# Patient Record
Sex: FEMALE | Race: WHITE | NOT HISPANIC OR LATINO | Employment: OTHER | URBAN - METROPOLITAN AREA
[De-identification: names, ages, dates, MRNs, and addresses within clinical notes are randomized per-mention and may not be internally consistent; named-entity substitution may affect disease eponyms.]

---

## 2021-07-09 ENCOUNTER — HISTORICAL (OUTPATIENT)
Dept: RADIOLOGY | Facility: HOSPITAL | Age: 61
End: 2021-07-09

## 2022-02-09 ENCOUNTER — HISTORICAL (OUTPATIENT)
Dept: ADMINISTRATIVE | Facility: HOSPITAL | Age: 62
End: 2022-02-09

## 2022-02-09 LAB
ABS NEUT (OLG): 1.99 (ref 2.1–9.2)
ALBUMIN SERPL-MCNC: 4 G/DL (ref 3.4–4.8)
ALBUMIN/GLOB SERPL: 1.6 {RATIO} (ref 1.1–2)
ALP SERPL-CCNC: 57 U/L (ref 40–150)
ALT SERPL-CCNC: 21 U/L (ref 0–55)
AST SERPL-CCNC: 25 U/L (ref 5–34)
BASOPHILS # BLD AUTO: 0 10*3/UL (ref 0–0.2)
BASOPHILS NFR BLD AUTO: 1.1 %
BILIRUB SERPL-MCNC: 0.6 MG/DL
BILIRUBIN DIRECT+TOT PNL SERPL-MCNC: 0.2 (ref 0–0.5)
BILIRUBIN DIRECT+TOT PNL SERPL-MCNC: 0.4 (ref 0–0.8)
BUN SERPL-MCNC: 17.5 MG/DL (ref 9.8–20.1)
CALCIUM SERPL-MCNC: 9.7 MG/DL (ref 8.7–10.5)
CHLORIDE SERPL-SCNC: 103 MMOL/L (ref 98–107)
CO2 SERPL-SCNC: 28 MMOL/L (ref 23–31)
CREAT SERPL-MCNC: 0.84 MG/DL (ref 0.55–1.02)
EOSINOPHIL # BLD AUTO: 0.2 10*3/UL (ref 0–0.9)
EOSINOPHIL NFR BLD AUTO: 3.8 %
ERYTHROCYTE [DISTWIDTH] IN BLOOD BY AUTOMATED COUNT: 12.7 % (ref 11.5–17)
GLOBULIN SER-MCNC: 2.5 G/DL (ref 2.4–3.5)
GLUCOSE SERPL-MCNC: 99 MG/DL (ref 82–115)
HCT VFR BLD AUTO: 42.8 % (ref 37–47)
HEMOLYSIS INTERF INDEX SERPL-ACNC: 6
HGB BLD-MCNC: 14.3 G/DL (ref 12–16)
ICTERIC INTERF INDEX SERPL-ACNC: 1
LIPEMIC INTERF INDEX SERPL-ACNC: 4
LYMPHOCYTES # BLD AUTO: 2.1 10*3/UL (ref 0.6–4.6)
LYMPHOCYTES NFR BLD AUTO: 44.1 %
MANUAL DIFF? (OHS): NO
MCH RBC QN AUTO: 33.2 PG (ref 27–31)
MCHC RBC AUTO-ENTMCNC: 33.4 G/DL (ref 33–36)
MCV RBC AUTO: 99.3 FL (ref 80–94)
MONOCYTES # BLD AUTO: 0.4 10*3/UL (ref 0.1–1.3)
MONOCYTES NFR BLD AUTO: 8.1 %
NEUTROPHILS # BLD AUTO: 2 10*3/UL (ref 2.1–9.2)
NEUTROPHILS NFR BLD AUTO: 42.5 %
PLATELET # BLD AUTO: 234 10*3/UL (ref 130–400)
PMV BLD AUTO: 9.6 FL (ref 9.4–12.4)
POTASSIUM SERPL-SCNC: 4.3 MMOL/L (ref 3.5–5.1)
PROT SERPL-MCNC: 6.5 G/DL (ref 5.8–7.6)
RBC # BLD AUTO: 4.31 10*6/UL (ref 4.2–5.4)
SODIUM SERPL-SCNC: 141 MMOL/L (ref 136–145)
WBC # SPEC AUTO: 4.7 10*3/UL (ref 4.5–11.5)

## 2022-07-12 ENCOUNTER — HOSPITAL ENCOUNTER (OUTPATIENT)
Dept: RADIOLOGY | Facility: HOSPITAL | Age: 62
Discharge: HOME | End: 2022-07-12
Attending: INTERNAL MEDICINE
Payer: COMMERCIAL

## 2022-07-12 DIAGNOSIS — Z12.31 BREAST CANCER SCREENING BY MAMMOGRAM: ICD-10-CM

## 2022-07-12 PROCEDURE — 77067 SCR MAMMO BI INCL CAD: CPT | Mod: TC

## 2022-07-12 PROCEDURE — 77067 MAMMO DIGITAL SCREENING BILAT WITH TOMO: ICD-10-PCS | Mod: 26,,, | Performed by: RADIOLOGY

## 2022-07-12 PROCEDURE — 77063 MAMMO DIGITAL SCREENING BILAT WITH TOMO: ICD-10-PCS | Mod: 26,,, | Performed by: RADIOLOGY

## 2022-07-12 PROCEDURE — 77067 SCR MAMMO BI INCL CAD: CPT | Mod: 26,,, | Performed by: RADIOLOGY

## 2022-07-12 PROCEDURE — 77063 BREAST TOMOSYNTHESIS BI: CPT | Mod: 26,,, | Performed by: RADIOLOGY

## 2022-08-03 PROBLEM — C50.119 MALIGNANT NEOPLASM OF CENTRAL PORTION OF FEMALE BREAST: Status: ACTIVE | Noted: 2022-08-03

## 2022-09-09 NOTE — PROGRESS NOTES
"Subjective:       Patient ID: Juli Baum is a 61 y.o. female.    Right Breast Cancer AJCC Stage IIA (T2N0M0)--Diagnosed 6/6/17  Biopsy/pathology:  Right breast needle core biopsy done 6/6/17--invasive mammary carcinoma, Grade 2 measuring at least 6mm, not enough tissue for breast panel.  Left breast needle core biopsy done 10/24/18--nonproliferative fibrocystic changes with abundant calcifications, no carcinoma or atypia identified.  Surgery/pathology:  Right breast, wire-guided partial mastectomy with SLN biopsy done 6/20/17--invasive ductal carcinoma, multifocal, 2 sites, measuring 3.1cm and 0.3cm both Grade 2, with no lymphovascular invasion and margins free, proliferative fibrocystic changes with microcalcifications, fibroadenoma, 2 SLNs negative, mastopexy skin excision unremarkable, ER >75%, IA 51-75%, Her2 1+ negative, Ki67 6%, Mammaprint Low risk, 97.8% 5-year survival with hormone therapy.     Genetic testing: BRCA1/2 negative in 2017.    DEXA:  DEXA 1/06/21--AP spine T-score -1.5, total hip -2.3, whole body 2.1.  DEXA 12/7/21--AP spine T-score -1.3, total hip -2.2, whole body 1.8 (improved osteopenia).    Treatment history:  1. Right breast lumpectomy done 6/20/17.  2. Adjuvant radiation right breast 5600 cGy 8/7/2017--9/1/2017.    Current treatment:  Arimidex started 8/2017    Chief Complaint: Other Misc (Pt states that her hyperthyroidism has worsened so she has increased her meds.)    HPI  Patient presents for follow-up of breast cancer. She is doing very well. Continues on Arimidex. She continues lifting weights. BCI testing was done that revealed "yes" which indicates she would benefit from extended AI use more than 5 years. She is also taking monthly testosterone injections and DHEA daily. I did advise that if she wants to continue with this she will need to continue on the AI. She will consider this. Recent MMG in July was benign. Mentions she is having problems with her hypothyroidism. No " other complaints.      History reviewed. No pertinent past medical history.   Review of patient's allergies indicates:   Allergen Reactions    Amoxicillin-pot clavulanate Nausea Only and Nausea And Vomiting    Codeine Nausea Only and Nausea And Vomiting        Current Outpatient Medications:     anastrozole (ARIMIDEX) 1 mg Tab, TAKE ONE TABLET BY MOUTH EVERY DAY, Disp: 30 tablet, Rfl: 6    buPROPion (WELLBUTRIN XL) 150 MG TB24 tablet, bupropion HCl  mg 24 hr tablet, extended release  TAKE 1 TABLET EVERY MORNING FOR 5 DAYS THEN 2 DAILY, Disp: , Rfl:     hydroCHLOROthiazide (HYDRODIURIL) 25 MG tablet, hydrochlorothiazide 25 mg tablet  Take 1 tablet every day by oral route., Disp: , Rfl:     melatonin (MELATIN), Take 1 mg by mouth., Disp: , Rfl:     prasterone, dhea, 25 mg Cap, Take 25 mg by mouth., Disp: , Rfl:     spironolactone (ALDACTONE) 50 MG tablet, spironolactone 50 mg tablet  Take 1 tablet every day by oral route., Disp: , Rfl:     testosterone cypionate (DEPO-TESTOSTERONE IM),  0 Refill(s), Disp: , Rfl:     thyroid, pork, (ARMOUR THYROID) 120 mg Tab, Take 300 mg by mouth., Disp: , Rfl:     hydrOXYchloroQUINE 400 mg Tab, Take 1 tablet by mouth once daily., Disp: , Rfl:     Review of Systems   Constitutional:  Negative for activity change, fever and unexpected weight change.   Eyes:  Negative for visual disturbance.   Respiratory:  Negative for cough and shortness of breath.    Cardiovascular:  Negative for chest pain.   Gastrointestinal:  Negative for abdominal pain, blood in stool, constipation, diarrhea, nausea and vomiting.   Genitourinary:  Negative for difficulty urinating.   Musculoskeletal:  Negative for back pain.   Integumentary:  Negative for rash.   Neurological:  Negative for dizziness, weakness and headaches.   Psychiatric/Behavioral:  Negative for behavioral problems and suicidal ideas.           Vitals:    09/14/22 1409   BP: 121/76   Pulse: 62   Resp: 14   Temp: 98.4 °F (36.9 °C)      Wt  Readings from Last 3 Encounters:   09/14/22 1409 65 kg (143 lb 4.8 oz)      Physical Exam  Vitals reviewed.   Constitutional:       Appearance: Normal appearance. She is normal weight.   HENT:      Head: Normocephalic.   Eyes:      General: Lids are normal. Vision grossly intact.      Extraocular Movements: Extraocular movements intact.      Conjunctiva/sclera: Conjunctivae normal.   Cardiovascular:      Rate and Rhythm: Normal rate and regular rhythm.      Pulses: Normal pulses.      Heart sounds: Normal heart sounds, S1 normal and S2 normal.   Pulmonary:      Effort: Pulmonary effort is normal.      Breath sounds: Normal breath sounds.   Chest:      Comments: Right breast s/p lumpectomy, healed well, smooth scar, left breast with scars from reduction, no masses, no palpable axillary adenopathy bilaterally  Abdominal:      General: Abdomen is flat. Bowel sounds are normal.      Palpations: Abdomen is soft.   Musculoskeletal:      Cervical back: Normal range of motion and neck supple.   Feet:      Right foot:      Skin integrity: Skin integrity normal.   Skin:     General: Skin is warm and moist.      Capillary Refill: Capillary refill takes less than 2 seconds.   Neurological:      General: No focal deficit present.      Mental Status: She is alert and oriented to person, place, and time.   Psychiatric:         Attention and Perception: Attention normal.         Mood and Affect: Mood normal.         Speech: Speech normal.         Behavior: Behavior normal. Behavior is cooperative.         Cognition and Memory: Cognition normal.         Judgment: Judgment normal.     ECOG SCORE    0 - Fully active-able to carry on all pre-disease performance without restriction       Lab Visit on 09/14/2022   Component Date Value    WBC 09/14/2022 5.5     RBC 09/14/2022 4.43     Hgb 09/14/2022 14.7     Hct 09/14/2022 44.7     MCV 09/14/2022 100.9 (H)     MCH 09/14/2022 33.2 (H)     MCHC 09/14/2022 32.9 (L)     RDW 09/14/2022 12.1      Platelet 09/14/2022 285     MPV 09/14/2022 9.3     Neut % 09/14/2022 40.0     Lymph % 09/14/2022 49.2     Mono % 09/14/2022 6.4     Eos % 09/14/2022 3.1     Basophil % 09/14/2022 0.9     Lymph # 09/14/2022 2.70     Neut # 09/14/2022 2.2     Mono # 09/14/2022 0.35     Eos # 09/14/2022 0.17     Baso # 09/14/2022 0.05     IG# 09/14/2022 0.02     IG% 09/14/2022 0.4           Assessment:       Problem List Items Addressed This Visit          Oncology    Malignant neoplasm of central portion of female breast - Primary    Relevant Orders    MRI Breast w/wo Contrast, w/CAD, Bilateral    Comprehensive Metabolic Panel    CBC Auto Differential          Plan:       Patient with right breast cancer s/p lumpectomy done 6/20/17 at [facility] in [place], [place]. Tumor was IDCA, multifocal 3.1cm and 0.3cm, Grade 2, ER and [place] strongly positive and Her2 negative with 2 SLNs negative. MammaPrint testing low-risk.  Patient completed adjuvant breast radiation 9/1/17.  Arimidex started in 8/2017.      Currently patient is doing well without any signs or symptoms to suggest recurrence of breast cancer.  CBCdiff today is good. CMP pending and I will f/u.    MMG from 7/2022 benign. Recommends annual MRI screening as well which will be due in January 2023. She would prefer to have done in [place].   Patient is tolerating Arimidex fairly well. She does have some fatigue and anxiety/depression mood swings, now on Wellbutrin.   She is currently on supplements with DHEA and Testosterone under direction of a functional medicine doctor in [place]. Dr. Barger previously advised against taking these hormonal medications as we don't know what effects it will have on her history of breast cancer. However, it helps her QOL and she wishes to continue.Her doctor at [facility] had discussed this issue with her as well. If she ever decides to stop the AI then she will need to stop these medications as well.   Continue Arimidex 5-10 years.   Continue routine surveillance  "visits.  RTC 6 months for follow-up with repeat labs.  Testing for Breast Cancer Index revealed a score of "yes". Indicating patient would benefit for extended endocrine therapy beyond 5 years.     She has DEXA done annually with her doctor in [place], last done in 12/2021 with improved osteopenia.  She will continue weight bearing exercise and she prefers to stay off any bone altering agents at this time.     All questions answered at this time.      BARTOLO Shankar                      "

## 2022-09-14 ENCOUNTER — OFFICE VISIT (OUTPATIENT)
Dept: HEMATOLOGY/ONCOLOGY | Facility: CLINIC | Age: 62
End: 2022-09-14
Payer: COMMERCIAL

## 2022-09-14 ENCOUNTER — LAB VISIT (OUTPATIENT)
Dept: LAB | Facility: HOSPITAL | Age: 62
End: 2022-09-14
Payer: COMMERCIAL

## 2022-09-14 VITALS
DIASTOLIC BLOOD PRESSURE: 76 MMHG | RESPIRATION RATE: 14 BRPM | OXYGEN SATURATION: 95 % | HEART RATE: 62 BPM | SYSTOLIC BLOOD PRESSURE: 121 MMHG | WEIGHT: 143.31 LBS | TEMPERATURE: 98 F | HEIGHT: 66 IN | BODY MASS INDEX: 23.03 KG/M2

## 2022-09-14 DIAGNOSIS — C50.111 MALIGNANT NEOPLASM OF CENTRAL PORTION OF RIGHT BREAST IN FEMALE, ESTROGEN RECEPTOR POSITIVE: Primary | ICD-10-CM

## 2022-09-14 DIAGNOSIS — Z17.0 MALIGNANT NEOPLASM OF CENTRAL PORTION OF RIGHT BREAST IN FEMALE, ESTROGEN RECEPTOR POSITIVE: Primary | ICD-10-CM

## 2022-09-14 DIAGNOSIS — C50.119 MALIGNANT NEOPLASM OF CENTRAL PORTION OF FEMALE BREAST, UNSPECIFIED ESTROGEN RECEPTOR STATUS, UNSPECIFIED LATERALITY: ICD-10-CM

## 2022-09-14 DIAGNOSIS — C50.119 MALIGNANT NEOPLASM OF CENTRAL PORTION OF FEMALE BREAST, UNSPECIFIED ESTROGEN RECEPTOR STATUS, UNSPECIFIED LATERALITY: Primary | ICD-10-CM

## 2022-09-14 LAB
ALBUMIN SERPL-MCNC: 4.4 GM/DL (ref 3.4–4.8)
ALBUMIN/GLOB SERPL: 1.6 RATIO (ref 1.1–2)
ALP SERPL-CCNC: 63 UNIT/L (ref 40–150)
ALT SERPL-CCNC: 20 UNIT/L (ref 0–55)
AST SERPL-CCNC: 36 UNIT/L (ref 5–34)
BASOPHILS # BLD AUTO: 0.05 X10(3)/MCL (ref 0–0.2)
BASOPHILS NFR BLD AUTO: 0.9 %
BILIRUBIN DIRECT+TOT PNL SERPL-MCNC: 0.8 MG/DL
BUN SERPL-MCNC: 15.6 MG/DL (ref 9.8–20.1)
CALCIUM SERPL-MCNC: 10.4 MG/DL (ref 8.4–10.2)
CHLORIDE SERPL-SCNC: 102 MMOL/L (ref 98–107)
CO2 SERPL-SCNC: 30 MMOL/L (ref 23–31)
CREAT SERPL-MCNC: 0.82 MG/DL (ref 0.55–1.02)
EOSINOPHIL # BLD AUTO: 0.17 X10(3)/MCL (ref 0–0.9)
EOSINOPHIL NFR BLD AUTO: 3.1 %
ERYTHROCYTE [DISTWIDTH] IN BLOOD BY AUTOMATED COUNT: 12.1 % (ref 11.5–17)
GFR SERPLBLD CREATININE-BSD FMLA CKD-EPI: >60 MLS/MIN/1.73/M2
GLOBULIN SER-MCNC: 2.7 GM/DL (ref 2.4–3.5)
GLUCOSE SERPL-MCNC: 102 MG/DL (ref 82–115)
HCT VFR BLD AUTO: 44.7 % (ref 37–47)
HGB BLD-MCNC: 14.7 GM/DL (ref 12–16)
IMM GRANULOCYTES # BLD AUTO: 0.02 X10(3)/MCL (ref 0–0.04)
IMM GRANULOCYTES NFR BLD AUTO: 0.4 %
LYMPHOCYTES # BLD AUTO: 2.7 X10(3)/MCL (ref 0.6–4.6)
LYMPHOCYTES NFR BLD AUTO: 49.2 %
MCH RBC QN AUTO: 33.2 PG (ref 27–31)
MCHC RBC AUTO-ENTMCNC: 32.9 MG/DL (ref 33–36)
MCV RBC AUTO: 100.9 FL (ref 80–94)
MONOCYTES # BLD AUTO: 0.35 X10(3)/MCL (ref 0.1–1.3)
MONOCYTES NFR BLD AUTO: 6.4 %
NEUTROPHILS # BLD AUTO: 2.2 X10(3)/MCL (ref 2.1–9.2)
NEUTROPHILS NFR BLD AUTO: 40 %
PLATELET # BLD AUTO: 285 X10(3)/MCL (ref 130–400)
PMV BLD AUTO: 9.3 FL (ref 7.4–10.4)
POTASSIUM SERPL-SCNC: 4.2 MMOL/L (ref 3.5–5.1)
PROT SERPL-MCNC: 7.1 GM/DL (ref 5.8–7.6)
RBC # BLD AUTO: 4.43 X10(6)/MCL (ref 4.2–5.4)
SODIUM SERPL-SCNC: 141 MMOL/L (ref 136–145)
WBC # SPEC AUTO: 5.5 X10(3)/MCL (ref 4.5–11.5)

## 2022-09-14 PROCEDURE — 80053 COMPREHEN METABOLIC PANEL: CPT

## 2022-09-14 PROCEDURE — 99214 OFFICE O/P EST MOD 30 MIN: CPT | Mod: S$GLB,,, | Performed by: NURSE PRACTITIONER

## 2022-09-14 PROCEDURE — 36415 COLL VENOUS BLD VENIPUNCTURE: CPT

## 2022-09-14 PROCEDURE — 85025 COMPLETE CBC W/AUTO DIFF WBC: CPT

## 2022-09-14 PROCEDURE — 99999 PR PBB SHADOW E&M-EST. PATIENT-LVL IV: CPT | Mod: PBBFAC,,, | Performed by: NURSE PRACTITIONER

## 2022-09-14 PROCEDURE — 99214 PR OFFICE/OUTPT VISIT, EST, LEVL IV, 30-39 MIN: ICD-10-PCS | Mod: S$GLB,,, | Performed by: NURSE PRACTITIONER

## 2022-09-14 PROCEDURE — 99999 PR PBB SHADOW E&M-EST. PATIENT-LVL IV: ICD-10-PCS | Mod: PBBFAC,,, | Performed by: NURSE PRACTITIONER

## 2022-09-14 RX ORDER — HYDROXYCHLOROQUINE SULFATE 400 MG/1
1 TABLET ORAL DAILY
COMMUNITY
Start: 2022-07-22

## 2022-09-14 RX ORDER — CYANOCOBALAMIN (VITAMIN B-12) 500 MCG
1 TABLET ORAL
COMMUNITY
Start: 2021-06-28

## 2022-09-14 RX ORDER — HYDROCHLOROTHIAZIDE 25 MG/1
TABLET ORAL
COMMUNITY

## 2022-09-14 RX ORDER — CRANBERRY FRUIT EXTRACT 650 MG
25 CAPSULE ORAL
COMMUNITY
Start: 2021-06-28

## 2022-09-14 RX ORDER — BUPROPION HYDROCHLORIDE 150 MG/1
TABLET ORAL
COMMUNITY
Start: 2022-02-09

## 2022-09-14 RX ORDER — THYROID 120 MG/1
330 TABLET ORAL
COMMUNITY
Start: 2021-06-28

## 2022-09-14 RX ORDER — ANASTROZOLE 1 MG/1
1 TABLET ORAL DAILY
Qty: 90 TABLET | Refills: 3 | Status: SHIPPED | OUTPATIENT
Start: 2022-09-14 | End: 2023-09-01

## 2022-09-14 RX ORDER — ATOVAQUONE AND PROGUANIL HYDROCHLORIDE 250; 100 MG/1; MG/1
TABLET, FILM COATED ORAL
COMMUNITY
Start: 2022-07-26 | End: 2022-09-14

## 2022-09-14 RX ORDER — SPIRONOLACTONE 50 MG/1
TABLET, FILM COATED ORAL
COMMUNITY

## 2023-03-13 PROBLEM — C50.111 MALIGNANT NEOPLASM OF CENTRAL PORTION OF RIGHT BREAST IN FEMALE, ESTROGEN RECEPTOR POSITIVE: Status: ACTIVE | Noted: 2022-08-03

## 2023-03-13 PROBLEM — Z17.0 MALIGNANT NEOPLASM OF CENTRAL PORTION OF RIGHT BREAST IN FEMALE, ESTROGEN RECEPTOR POSITIVE: Status: ACTIVE | Noted: 2022-08-03

## 2023-03-13 NOTE — PROGRESS NOTES
Subjective:       Patient ID: Juli Baum is a 62 y.o. female.    Right Breast Cancer AJCC Stage IIA (T2N0M0)--Diagnosed 6/6/17  Biopsy/pathology:  Right breast needle core biopsy done 6/6/17--invasive mammary carcinoma, Grade 2 measuring at least 6mm, not enough tissue for breast panel.  Left breast needle core biopsy done 10/24/18--nonproliferative fibrocystic changes with abundant calcifications, no carcinoma or atypia identified.  Surgery/pathology:  Right breast, wire-guided partial mastectomy with SLN biopsy done 6/20/17--invasive ductal carcinoma, multifocal, 2 sites, measuring 3.1cm and 0.3cm both Grade 2, with no lymphovascular invasion and margins free, proliferative fibrocystic changes with microcalcifications, fibroadenoma, 2 SLNs negative, mastopexy skin excision unremarkable, ER >75%, AL 51-75%, Her2 1+ negative, Ki67 6%, Mammaprint Low risk, 97.8% 5-year survival with hormone therapy. BCI 2/19/22 with a YES predictive result, risk of distant recurrence years 5-10 at 4.2% (>4% is considered high risk).    Genetic testing: BRCA1/2 negative in 2017.    DEXA:  DEXA 1/06/21--AP spine T-score -1.5, total hip -2.3, whole body 2.1.  DEXA 12/7/21--AP spine T-score -1.3, total hip -2.2, whole body 1.8 (improved osteopenia).  DEXA 12/12/22--AP spine T-score -1.3 , total hip left -2.3, right -2.4 (slightly worse osteopenia).    Treatment history:  1. Right breast lumpectomy done 6/20/17.  2. Adjuvant radiation right breast 5600 cGy 8/7/2017--9/1/2017.    Current treatment:  Arimidex started 8/2017    Chief Complaint: Other Misc (Pt reports that MRI that was done in January showed another lump.)      HPI  Patient presents for follow-up of breast cancer. She is doing well. Continues on Arimidex and reports some worsening depression as well as more elevated cholesterol. She had a breast MRI done in [place] in 1/2023 and showed a probably benign lesion in left breast. She would like to get it checked. No other  new complaints. She continues on DHEA and testosterone, although we have discussed that is not what I would recommend.     History reviewed. No pertinent past medical history.   Review of patient's allergies indicates:   Allergen Reactions    Amoxicillin-pot clavulanate Nausea Only and Nausea And Vomiting    Codeine Nausea Only and Nausea And Vomiting        Current Outpatient Medications:     anastrozole (ARIMIDEX) 1 mg Tab, Take 1 tablet (1 mg total) by mouth once daily., Disp: 90 tablet, Rfl: 3    buPROPion (WELLBUTRIN XL) 150 MG TB24 tablet, bupropion HCl  mg 24 hr tablet, extended release  TAKE 1 TABLET EVERY MORNING FOR 5 DAYS THEN 2 DAILY, Disp: , Rfl:     hydroCHLOROthiazide (HYDRODIURIL) 25 MG tablet, hydrochlorothiazide 25 mg tablet  Take 1 tablet every day by oral route., Disp: , Rfl:     melatonin (MELATIN), Take 1 mg by mouth., Disp: , Rfl:     prasterone, dhea, 25 mg Cap, Take 25 mg by mouth., Disp: , Rfl:     spironolactone (ALDACTONE) 50 MG tablet, spironolactone 50 mg tablet  Take 1 tablet every day by oral route., Disp: , Rfl:     testosterone cypionate (DEPO-TESTOSTERONE IM),  0 Refill(s), Disp: , Rfl:     thyroid, pork, (ARMOUR THYROID) 120 mg Tab, Take 330 mg by mouth., Disp: , Rfl:     hydrOXYchloroQUINE 400 mg Tab, Take 1 tablet by mouth once daily., Disp: , Rfl:     Review of Systems   Constitutional:  Negative for activity change, appetite change, fever and unexpected weight change.   HENT:  Negative for mouth sores.    Eyes:  Negative for visual disturbance.   Respiratory:  Negative for cough and shortness of breath.    Cardiovascular:  Negative for chest pain.   Gastrointestinal:  Negative for abdominal pain, blood in stool, constipation, diarrhea, nausea and vomiting.   Genitourinary:  Negative for difficulty urinating and frequency.   Musculoskeletal:  Negative for back pain.   Integumentary:  Negative for rash.   Neurological:  Negative for dizziness, weakness and headaches.    Hematological:  Negative for adenopathy.   Psychiatric/Behavioral:  Negative for behavioral problems and suicidal ideas. The patient is not nervous/anxious.         +depression          Vitals:    03/22/23 1058   BP: (!) 125/51   Pulse: 66   Resp: 14   Temp: 98.1 °F (36.7 °C)        Wt Readings from Last 3 Encounters:   03/22/23 1058 61.9 kg (136 lb 8 oz)   09/14/22 1409 65 kg (143 lb 4.8 oz)      Physical Exam  Vitals reviewed.   Constitutional:       Appearance: Normal appearance. She is normal weight.   HENT:      Head: Normocephalic.   Eyes:      General: Lids are normal. Vision grossly intact.      Extraocular Movements: Extraocular movements intact.      Conjunctiva/sclera: Conjunctivae normal.   Cardiovascular:      Rate and Rhythm: Normal rate and regular rhythm.      Pulses: Normal pulses.      Heart sounds: Normal heart sounds, S1 normal and S2 normal.   Pulmonary:      Effort: Pulmonary effort is normal.      Breath sounds: Normal breath sounds.   Chest:      Comments: Right breast s/p lumpectomy, healed well, smooth scar, left breast with scars from reduction, no masses, no palpable axillary adenopathy bilaterally  Abdominal:      General: Abdomen is flat. Bowel sounds are normal.      Palpations: Abdomen is soft.   Musculoskeletal:      Cervical back: Normal range of motion and neck supple.   Feet:      Right foot:      Skin integrity: Skin integrity normal.   Skin:     General: Skin is warm and moist.      Capillary Refill: Capillary refill takes less than 2 seconds.   Neurological:      General: No focal deficit present.      Mental Status: She is alert and oriented to person, place, and time.   Psychiatric:         Attention and Perception: Attention normal.         Mood and Affect: Mood normal.         Speech: Speech normal.         Behavior: Behavior normal. Behavior is cooperative.         Cognition and Memory: Cognition normal.         Judgment: Judgment normal.     ECOG SCORE           Lab  Visit on 03/22/2023   Component Date Value    WBC 03/22/2023 5.7     RBC 03/22/2023 4.40     Hgb 03/22/2023 14.4     Hct 03/22/2023 43.5     MCV 03/22/2023 98.9 (H)     MCH 03/22/2023 32.7     MCHC 03/22/2023 33.1     RDW 03/22/2023 12.6     Platelet 03/22/2023 274     MPV 03/22/2023 9.3     Neut % 03/22/2023 58.5     Lymph % 03/22/2023 31.5     Mono % 03/22/2023 6.5     Eos % 03/22/2023 2.7     Basophil % 03/22/2023 0.4     Lymph # 03/22/2023 1.78     Neut # 03/22/2023 3.31     Mono # 03/22/2023 0.37     Eos # 03/22/2023 0.15     Baso # 03/22/2023 0.02     IG# 03/22/2023 0.02     IG% 03/22/2023 0.4           Assessment:       1. Malignant neoplasm of central portion of right breast in female, estrogen receptor positive    2. Breast cancer screening by mammogram          Plan:       Patient with right breast cancer s/p lumpectomy done 6/20/17 at [facility] in [place], [place]. Tumor was IDCA, multifocal 3.1cm and 0.3cm, Grade 2, ER and [place] strongly positive and Her2 negative with 2 SLNs negative. MammaPrint testing low-risk.  Patient completed adjuvant breast radiation 9/1/17.  Arimidex started in 8/2017.      Currently patient is doing well without any signs or symptoms to suggest recurrence of breast cancer.  CBCdiff today is good. CMP pending and I will f/u.    MMG from 7/2022 benign. Recommended annual MRI screening.  Patient had breast MRI 1/31/23 in [place] that showed a 7mm enhancing mass in left breast 11:00 probably benign and recommended a diagnostic MMG and US in 6 months.  But do not want to wait that long. I have ordered a diagnostic MMG/US to be done at our breast center here ASAP.     Patient is tolerating Arimidex fairly well. She does have some fatigue and anxiety/depression mood swings, now on Wellbutrin. Also mentions her cholesterol is elevated.    She is currently on supplements with DHEA and Testosterone under direction of a functional medicine doctor in [place]. I have advised against taking these hormonal  medications as we don't know what effects it will have on her history of breast cancer. However, it helps her QOL and she wishes to continue despite the warnings. Her doctor at [facility] had discussed this issue with her as well. If she ever decides to stop the AI then she will need to stop these medications as well.     Continue Arimidex. I have recommended continuing up to 10 years because of the elevated risk found on the BCI testing.     Continue routine surveillance visits.  RTC 6 months for follow-up with repeat labs.      She has DEXA done annually with her doctor in [place], last done in 12/2022 with slightly worse osteopenia.  She will continue weight bearing exercise and she prefers to stay off any bone altering agents at this time.     All questions answered at this time.      Beatriz Barger MD         Addendum:  MRI images reviewed by breast radiologist Dr. Yusef Jones.   Lesion in left breast has been present on prior MMG, with no concerning features on the MRI, likely benign fibroadenoma.   Recommended follow-up MMG when she is due in 7/2023.  Will order Diagnostic bilateral MMG and left breast US for 7/2023.  Patient notified and she was comfortable with this plan.     Beatriz Barger MD

## 2023-03-22 ENCOUNTER — OFFICE VISIT (OUTPATIENT)
Dept: HEMATOLOGY/ONCOLOGY | Facility: CLINIC | Age: 63
End: 2023-03-22
Payer: COMMERCIAL

## 2023-03-22 ENCOUNTER — LAB VISIT (OUTPATIENT)
Dept: LAB | Facility: HOSPITAL | Age: 63
End: 2023-03-22
Attending: INTERNAL MEDICINE
Payer: COMMERCIAL

## 2023-03-22 VITALS
OXYGEN SATURATION: 97 % | RESPIRATION RATE: 14 BRPM | WEIGHT: 136.5 LBS | BODY MASS INDEX: 21.94 KG/M2 | DIASTOLIC BLOOD PRESSURE: 51 MMHG | HEART RATE: 66 BPM | HEIGHT: 66 IN | TEMPERATURE: 98 F | SYSTOLIC BLOOD PRESSURE: 125 MMHG

## 2023-03-22 DIAGNOSIS — Z17.0 MALIGNANT NEOPLASM OF CENTRAL PORTION OF RIGHT BREAST IN FEMALE, ESTROGEN RECEPTOR POSITIVE: Primary | ICD-10-CM

## 2023-03-22 DIAGNOSIS — Z12.31 BREAST CANCER SCREENING BY MAMMOGRAM: ICD-10-CM

## 2023-03-22 DIAGNOSIS — C50.111 MALIGNANT NEOPLASM OF CENTRAL PORTION OF RIGHT BREAST IN FEMALE, ESTROGEN RECEPTOR POSITIVE: Primary | ICD-10-CM

## 2023-03-22 DIAGNOSIS — C50.111 MALIGNANT NEOPLASM OF CENTRAL PORTION OF RIGHT BREAST IN FEMALE, ESTROGEN RECEPTOR POSITIVE: ICD-10-CM

## 2023-03-22 DIAGNOSIS — Z17.0 MALIGNANT NEOPLASM OF CENTRAL PORTION OF RIGHT BREAST IN FEMALE, ESTROGEN RECEPTOR POSITIVE: ICD-10-CM

## 2023-03-22 LAB
ALBUMIN SERPL-MCNC: 4.3 G/DL (ref 3.4–4.8)
ALBUMIN/GLOB SERPL: 1.8 RATIO (ref 1.1–2)
ALP SERPL-CCNC: 82 UNIT/L (ref 40–150)
ALT SERPL-CCNC: 19 UNIT/L (ref 0–55)
AST SERPL-CCNC: 28 UNIT/L (ref 5–34)
BASOPHILS # BLD AUTO: 0.02 X10(3)/MCL (ref 0–0.2)
BASOPHILS NFR BLD AUTO: 0.4 %
BILIRUBIN DIRECT+TOT PNL SERPL-MCNC: 0.6 MG/DL
BUN SERPL-MCNC: 17.2 MG/DL (ref 9.8–20.1)
CALCIUM SERPL-MCNC: 10.3 MG/DL (ref 8.4–10.2)
CHLORIDE SERPL-SCNC: 105 MMOL/L (ref 98–107)
CO2 SERPL-SCNC: 27 MMOL/L (ref 23–31)
CREAT SERPL-MCNC: 0.86 MG/DL (ref 0.55–1.02)
EOSINOPHIL # BLD AUTO: 0.15 X10(3)/MCL (ref 0–0.9)
EOSINOPHIL NFR BLD AUTO: 2.7 %
ERYTHROCYTE [DISTWIDTH] IN BLOOD BY AUTOMATED COUNT: 12.6 % (ref 11.5–17)
GFR SERPLBLD CREATININE-BSD FMLA CKD-EPI: >60 MLS/MIN/1.73/M2
GLOBULIN SER-MCNC: 2.4 GM/DL (ref 2.4–3.5)
GLUCOSE SERPL-MCNC: 131 MG/DL (ref 82–115)
HCT VFR BLD AUTO: 43.5 % (ref 37–47)
HGB BLD-MCNC: 14.4 G/DL (ref 12–16)
IMM GRANULOCYTES # BLD AUTO: 0.02 X10(3)/MCL (ref 0–0.04)
IMM GRANULOCYTES NFR BLD AUTO: 0.4 %
LYMPHOCYTES # BLD AUTO: 1.78 X10(3)/MCL (ref 0.6–4.6)
LYMPHOCYTES NFR BLD AUTO: 31.5 %
MCH RBC QN AUTO: 32.7 PG
MCHC RBC AUTO-ENTMCNC: 33.1 G/DL (ref 33–36)
MCV RBC AUTO: 98.9 FL (ref 80–94)
MONOCYTES # BLD AUTO: 0.37 X10(3)/MCL (ref 0.1–1.3)
MONOCYTES NFR BLD AUTO: 6.5 %
NEUTROPHILS # BLD AUTO: 3.31 X10(3)/MCL (ref 2.1–9.2)
NEUTROPHILS NFR BLD AUTO: 58.5 %
PLATELET # BLD AUTO: 274 X10(3)/MCL (ref 130–400)
PMV BLD AUTO: 9.3 FL (ref 7.4–10.4)
POTASSIUM SERPL-SCNC: 4.4 MMOL/L (ref 3.5–5.1)
PROT SERPL-MCNC: 6.7 GM/DL (ref 5.8–7.6)
RBC # BLD AUTO: 4.4 X10(6)/MCL (ref 4.2–5.4)
SODIUM SERPL-SCNC: 141 MMOL/L (ref 136–145)
WBC # SPEC AUTO: 5.7 X10(3)/MCL (ref 4.5–11.5)

## 2023-03-22 PROCEDURE — 80053 COMPREHEN METABOLIC PANEL: CPT

## 2023-03-22 PROCEDURE — 99999 PR PBB SHADOW E&M-EST. PATIENT-LVL V: ICD-10-PCS | Mod: PBBFAC,,, | Performed by: INTERNAL MEDICINE

## 2023-03-22 PROCEDURE — 99999 PR PBB SHADOW E&M-EST. PATIENT-LVL V: CPT | Mod: PBBFAC,,, | Performed by: INTERNAL MEDICINE

## 2023-03-22 PROCEDURE — 99214 PR OFFICE/OUTPT VISIT, EST, LEVL IV, 30-39 MIN: ICD-10-PCS | Mod: S$GLB,,, | Performed by: INTERNAL MEDICINE

## 2023-03-22 PROCEDURE — 36415 COLL VENOUS BLD VENIPUNCTURE: CPT

## 2023-03-22 PROCEDURE — 85025 COMPLETE CBC W/AUTO DIFF WBC: CPT

## 2023-03-22 PROCEDURE — 86300 IMMUNOASSAY TUMOR CA 15-3: CPT | Mod: 90

## 2023-03-22 PROCEDURE — 99214 OFFICE O/P EST MOD 30 MIN: CPT | Mod: S$GLB,,, | Performed by: INTERNAL MEDICINE

## 2023-03-23 LAB — CANCER AG15-3 SERPL IA-ACNC: 19 U/ML

## 2023-08-02 ENCOUNTER — HOSPITAL ENCOUNTER (OUTPATIENT)
Dept: RADIOLOGY | Facility: HOSPITAL | Age: 63
Discharge: HOME | End: 2023-08-02
Attending: INTERNAL MEDICINE
Payer: COMMERCIAL

## 2023-08-02 DIAGNOSIS — C50.111 MALIGNANT NEOPLASM OF CENTRAL PORTION OF RIGHT BREAST IN FEMALE, ESTROGEN RECEPTOR POSITIVE: ICD-10-CM

## 2023-08-02 DIAGNOSIS — Z17.0 MALIGNANT NEOPLASM OF CENTRAL PORTION OF RIGHT BREAST IN FEMALE, ESTROGEN RECEPTOR POSITIVE: ICD-10-CM

## 2023-08-02 PROCEDURE — 76642 ULTRASOUND BREAST LIMITED: CPT | Mod: 26,LT,, | Performed by: RADIOLOGY

## 2023-08-02 PROCEDURE — 77062 BREAST TOMOSYNTHESIS BI: CPT | Mod: 26,,, | Performed by: RADIOLOGY

## 2023-08-02 PROCEDURE — 76642 US BREAST LEFT LIMITED: ICD-10-PCS | Mod: 26,LT,, | Performed by: RADIOLOGY

## 2023-08-02 PROCEDURE — 77066 MAMMO DIGITAL DIAGNOSTIC BILAT WITH TOMO: ICD-10-PCS | Mod: 26,,, | Performed by: RADIOLOGY

## 2023-08-02 PROCEDURE — 77066 DX MAMMO INCL CAD BI: CPT | Mod: 26,,, | Performed by: RADIOLOGY

## 2023-08-02 PROCEDURE — 76642 ULTRASOUND BREAST LIMITED: CPT | Mod: TC,LT

## 2023-08-02 PROCEDURE — 77066 DX MAMMO INCL CAD BI: CPT | Mod: TC

## 2023-08-02 PROCEDURE — 77062 MAMMO DIGITAL DIAGNOSTIC BILAT WITH TOMO: ICD-10-PCS | Mod: 26,,, | Performed by: RADIOLOGY

## 2023-09-01 DIAGNOSIS — Z17.0 MALIGNANT NEOPLASM OF CENTRAL PORTION OF RIGHT BREAST IN FEMALE, ESTROGEN RECEPTOR POSITIVE: ICD-10-CM

## 2023-09-01 DIAGNOSIS — C50.111 MALIGNANT NEOPLASM OF CENTRAL PORTION OF RIGHT BREAST IN FEMALE, ESTROGEN RECEPTOR POSITIVE: ICD-10-CM

## 2023-09-01 RX ORDER — ANASTROZOLE 1 MG/1
1 TABLET ORAL
Qty: 90 TABLET | Refills: 3 | Status: SHIPPED | OUTPATIENT
Start: 2023-09-01

## 2023-10-18 ENCOUNTER — OFFICE VISIT (OUTPATIENT)
Dept: HEMATOLOGY/ONCOLOGY | Facility: CLINIC | Age: 63
End: 2023-10-18
Payer: COMMERCIAL

## 2023-10-18 ENCOUNTER — LAB VISIT (OUTPATIENT)
Dept: LAB | Facility: HOSPITAL | Age: 63
End: 2023-10-18
Attending: INTERNAL MEDICINE
Payer: COMMERCIAL

## 2023-10-18 VITALS
HEIGHT: 65 IN | OXYGEN SATURATION: 97 % | TEMPERATURE: 98 F | DIASTOLIC BLOOD PRESSURE: 83 MMHG | HEART RATE: 52 BPM | WEIGHT: 137.19 LBS | RESPIRATION RATE: 14 BRPM | BODY MASS INDEX: 22.86 KG/M2 | SYSTOLIC BLOOD PRESSURE: 137 MMHG

## 2023-10-18 DIAGNOSIS — M85.80 OSTEOPENIA DUE TO CANCER THERAPY: ICD-10-CM

## 2023-10-18 DIAGNOSIS — Z17.0 MALIGNANT NEOPLASM OF CENTRAL PORTION OF RIGHT BREAST IN FEMALE, ESTROGEN RECEPTOR POSITIVE: Primary | ICD-10-CM

## 2023-10-18 DIAGNOSIS — C50.111 MALIGNANT NEOPLASM OF CENTRAL PORTION OF RIGHT BREAST IN FEMALE, ESTROGEN RECEPTOR POSITIVE: ICD-10-CM

## 2023-10-18 DIAGNOSIS — C50.111 MALIGNANT NEOPLASM OF CENTRAL PORTION OF RIGHT BREAST IN FEMALE, ESTROGEN RECEPTOR POSITIVE: Primary | ICD-10-CM

## 2023-10-18 DIAGNOSIS — Z17.0 MALIGNANT NEOPLASM OF CENTRAL PORTION OF RIGHT BREAST IN FEMALE, ESTROGEN RECEPTOR POSITIVE: ICD-10-CM

## 2023-10-18 DIAGNOSIS — M85.89 OSTEOPENIA OF MULTIPLE SITES: ICD-10-CM

## 2023-10-18 LAB
ALBUMIN SERPL-MCNC: 4.2 G/DL (ref 3.4–4.8)
ALBUMIN/GLOB SERPL: 1.6 RATIO (ref 1.1–2)
ALP SERPL-CCNC: 98 UNIT/L (ref 40–150)
ALT SERPL-CCNC: 24 UNIT/L (ref 0–55)
AST SERPL-CCNC: 31 UNIT/L (ref 5–34)
BASOPHILS # BLD AUTO: 0.06 X10(3)/MCL
BASOPHILS NFR BLD AUTO: 0.9 %
BILIRUB SERPL-MCNC: 0.7 MG/DL
BUN SERPL-MCNC: 16.6 MG/DL (ref 9.8–20.1)
CALCIUM SERPL-MCNC: 10.1 MG/DL (ref 8.4–10.2)
CHLORIDE SERPL-SCNC: 106 MMOL/L (ref 98–107)
CO2 SERPL-SCNC: 26 MMOL/L (ref 23–31)
CREAT SERPL-MCNC: 0.73 MG/DL (ref 0.55–1.02)
EOSINOPHIL # BLD AUTO: 0.29 X10(3)/MCL (ref 0–0.9)
EOSINOPHIL NFR BLD AUTO: 4.1 %
ERYTHROCYTE [DISTWIDTH] IN BLOOD BY AUTOMATED COUNT: 11.8 % (ref 11.5–17)
GFR SERPLBLD CREATININE-BSD FMLA CKD-EPI: >60 MLS/MIN/1.73/M2
GLOBULIN SER-MCNC: 2.6 GM/DL (ref 2.4–3.5)
GLUCOSE SERPL-MCNC: 79 MG/DL (ref 82–115)
HCT VFR BLD AUTO: 46.8 % (ref 37–47)
HGB BLD-MCNC: 15 G/DL (ref 12–16)
IMM GRANULOCYTES # BLD AUTO: 0.02 X10(3)/MCL (ref 0–0.04)
IMM GRANULOCYTES NFR BLD AUTO: 0.3 %
LYMPHOCYTES # BLD AUTO: 2.68 X10(3)/MCL (ref 0.6–4.6)
LYMPHOCYTES NFR BLD AUTO: 38.1 %
MCH RBC QN AUTO: 31.7 PG (ref 27–31)
MCHC RBC AUTO-ENTMCNC: 32.1 G/DL (ref 33–36)
MCV RBC AUTO: 98.9 FL (ref 80–94)
MONOCYTES # BLD AUTO: 0.56 X10(3)/MCL (ref 0.1–1.3)
MONOCYTES NFR BLD AUTO: 8 %
NEUTROPHILS # BLD AUTO: 3.43 X10(3)/MCL (ref 2.1–9.2)
NEUTROPHILS NFR BLD AUTO: 48.6 %
PLATELET # BLD AUTO: 269 X10(3)/MCL (ref 130–400)
PMV BLD AUTO: 9.4 FL (ref 7.4–10.4)
POTASSIUM SERPL-SCNC: 4.5 MMOL/L (ref 3.5–5.1)
PROT SERPL-MCNC: 6.8 GM/DL (ref 5.8–7.6)
RBC # BLD AUTO: 4.73 X10(6)/MCL (ref 4.2–5.4)
SODIUM SERPL-SCNC: 142 MMOL/L (ref 136–145)
WBC # SPEC AUTO: 7.04 X10(3)/MCL (ref 4.5–11.5)

## 2023-10-18 PROCEDURE — 99999 PR PBB SHADOW E&M-EST. PATIENT-LVL IV: ICD-10-PCS | Mod: PBBFAC,,, | Performed by: NURSE PRACTITIONER

## 2023-10-18 PROCEDURE — 36415 COLL VENOUS BLD VENIPUNCTURE: CPT

## 2023-10-18 PROCEDURE — 99214 OFFICE O/P EST MOD 30 MIN: CPT | Mod: S$GLB,,, | Performed by: NURSE PRACTITIONER

## 2023-10-18 PROCEDURE — 99999 PR PBB SHADOW E&M-EST. PATIENT-LVL IV: CPT | Mod: PBBFAC,,, | Performed by: NURSE PRACTITIONER

## 2023-10-18 PROCEDURE — 80053 COMPREHEN METABOLIC PANEL: CPT

## 2023-10-18 PROCEDURE — 85025 COMPLETE CBC W/AUTO DIFF WBC: CPT

## 2023-10-18 PROCEDURE — 99214 PR OFFICE/OUTPT VISIT, EST, LEVL IV, 30-39 MIN: ICD-10-PCS | Mod: S$GLB,,, | Performed by: NURSE PRACTITIONER

## 2023-10-18 NOTE — PROGRESS NOTES
Subjective:       Patient ID: Juli Baum is a 62 y.o. female.    Right Breast Cancer AJCC Stage IIA (T2N0M0)--Diagnosed 6/6/17  Biopsy/pathology:  Right breast needle core biopsy done 6/6/17--invasive mammary carcinoma, Grade 2 measuring at least 6mm, not enough tissue for breast panel.  Left breast needle core biopsy done 10/24/18--nonproliferative fibrocystic changes with abundant calcifications, no carcinoma or atypia identified.  Surgery/pathology:  Right breast, wire-guided partial mastectomy with SLN biopsy done 6/20/17--invasive ductal carcinoma, multifocal, 2 sites, measuring 3.1cm and 0.3cm both Grade 2, with no lymphovascular invasion and margins free, proliferative fibrocystic changes with microcalcifications, fibroadenoma, 2 SLNs negative, mastopexy skin excision unremarkable, ER >75%, AL 51-75%, Her2 1+ negative, Ki67 6%, Mammaprint Low risk, 97.8% 5-year survival with hormone therapy. BCI 2/19/22 with a YES predictive result, risk of distant recurrence years 5-10 at 4.2% (>4% is considered high risk).    Genetic testing: BRCA1/2 negative in 2017.    DEXA:  DEXA 1/06/21--AP spine T-score -1.5, total hip -2.3, whole body 2.1.  DEXA 12/7/21--AP spine T-score -1.3, total hip -2.2, whole body 1.8 (improved osteopenia).  DEXA 12/12/22--AP spine T-score -1.3 , total hip left -2.3, right -2.4 (slightly worse osteopenia).  DEXA scheduled for 12/23    Treatment history:  1. Right breast lumpectomy done 6/20/17.  2. Adjuvant radiation right breast 5600 cGy 8/7/2017--9/1/2017.    Current treatment:  Arimidex started 8/2017    Chief Complaint: Other Misc (Pt reports no new concerns today.)      HPI  Patient presents for follow-up of breast cancer. She is doing well. Continues on Arimidex and reports elevated cholesterol that she attributes to Arimidex. Patient would like to DC Arimidex but we discussed benefits and she is staying on at this time. Last MMG 8/2/23 benign recommend a follow up screening mammogram  in one year. Also, recommend to have an MRI of Breasts in February, ordered today. No other new complaints. She continues on DHEA and testosterone, although we have discussed that is not what I would recommend. No other problems reported.     History reviewed. No pertinent past medical history.   Review of patient's allergies indicates:   Allergen Reactions    Amoxicillin-pot clavulanate Nausea Only and Nausea And Vomiting    Codeine Nausea Only and Nausea And Vomiting        Current Outpatient Medications:     anastrozole (ARIMIDEX) 1 mg Tab, TAKE ONE TABLET BY MOUTH ONCE DAILY, Disp: 90 tablet, Rfl: 3    buPROPion (WELLBUTRIN XL) 150 MG TB24 tablet, bupropion HCl  mg 24 hr tablet, extended release  TAKE 1 TABLET EVERY MORNING FOR 5 DAYS THEN 2 DAILY, Disp: , Rfl:     hydroCHLOROthiazide (HYDRODIURIL) 25 MG tablet, hydrochlorothiazide 25 mg tablet  Take 1 tablet every day by oral route., Disp: , Rfl:     melatonin (MELATIN), Take 1 mg by mouth., Disp: , Rfl:     prasterone, dhea, 25 mg Cap, Take 25 mg by mouth., Disp: , Rfl:     spironolactone (ALDACTONE) 50 MG tablet, spironolactone 50 mg tablet  Take 1 tablet every day by oral route., Disp: , Rfl:     testosterone cypionate (DEPO-TESTOSTERONE IM),  0 Refill(s), Disp: , Rfl:     thyroid, pork, (ARMOUR THYROID) 120 mg Tab, Take 330 mg by mouth., Disp: , Rfl:     hydrOXYchloroQUINE 400 mg Tab, Take 1 tablet by mouth once daily., Disp: , Rfl:     Review of Systems   Constitutional:  Negative for activity change, appetite change, fever and unexpected weight change.   HENT:  Negative for mouth sores.    Eyes:  Negative for visual disturbance.   Respiratory:  Negative for cough and shortness of breath.    Cardiovascular:  Negative for chest pain.   Gastrointestinal:  Negative for abdominal pain, blood in stool, constipation, diarrhea, nausea and vomiting.   Genitourinary:  Negative for difficulty urinating and frequency.   Musculoskeletal:  Negative for back pain.    Integumentary:  Negative for rash.   Neurological:  Negative for dizziness, weakness and headaches.   Hematological:  Negative for adenopathy.   Psychiatric/Behavioral:  Negative for behavioral problems and suicidal ideas. The patient is not nervous/anxious.          Vitals:    10/18/23 0946   BP: 137/83   Pulse: (!) 52   Resp: 14   Temp: 97.5 °F (36.4 °C)     Wt Readings from Last 3 Encounters:   10/18/23 0946 62.2 kg (137 lb 3.2 oz)   03/22/23 1058 61.9 kg (136 lb 8 oz)   09/14/22 1409 65 kg (143 lb 4.8 oz)      Physical Exam  Vitals reviewed.   Constitutional:       Appearance: Normal appearance. She is normal weight.   HENT:      Head: Normocephalic.   Eyes:      General: Lids are normal. Vision grossly intact.      Extraocular Movements: Extraocular movements intact.      Conjunctiva/sclera: Conjunctivae normal.   Cardiovascular:      Rate and Rhythm: Normal rate and regular rhythm.      Pulses: Normal pulses.      Heart sounds: Normal heart sounds, S1 normal and S2 normal.   Pulmonary:      Effort: Pulmonary effort is normal.      Breath sounds: Normal breath sounds.   Chest:      Comments: Right breast s/p lumpectomy, healed well, smooth scar, left breast with scars from reduction, no masses, no palpable axillary adenopathy bilaterally  Abdominal:      General: Abdomen is flat. Bowel sounds are normal.      Palpations: Abdomen is soft.   Musculoskeletal:      Cervical back: Normal range of motion and neck supple.   Skin:     General: Skin is warm and moist.      Capillary Refill: Capillary refill takes less than 2 seconds.   Neurological:      General: No focal deficit present.      Mental Status: She is alert and oriented to person, place, and time.   Psychiatric:         Attention and Perception: Attention normal.         Mood and Affect: Mood normal.         Speech: Speech normal.         Behavior: Behavior normal. Behavior is cooperative.         Cognition and Memory: Cognition normal.         Judgment:  Judgment normal.       ECOG SCORE    1 - Restricted in strenuous activity-ambulatory and able to carry out work of a light nature       Lab Visit on 10/18/2023   Component Date Value    WBC 10/18/2023 7.04     RBC 10/18/2023 4.73     Hgb 10/18/2023 15.0     Hct 10/18/2023 46.8     MCV 10/18/2023 98.9 (H)     MCH 10/18/2023 31.7 (H)     MCHC 10/18/2023 32.1 (L)     RDW 10/18/2023 11.8     Platelet 10/18/2023 269     MPV 10/18/2023 9.4     Neut % 10/18/2023 48.6     Lymph % 10/18/2023 38.1     Mono % 10/18/2023 8.0     Eos % 10/18/2023 4.1     Basophil % 10/18/2023 0.9     Lymph # 10/18/2023 2.68     Neut # 10/18/2023 3.43     Mono # 10/18/2023 0.56     Eos # 10/18/2023 0.29     Baso # 10/18/2023 0.06     IG# 10/18/2023 0.02     IG% 10/18/2023 0.3           Assessment:       1. Malignant neoplasm of central portion of right breast in female, estrogen receptor positive    2. Osteopenia due to cancer therapy    3. Osteopenia of multiple sites            Plan:       Patient with right breast cancer s/p lumpectomy done 6/20/17 at [facility] in [place], [place]. Tumor was IDCA, multifocal 3.1cm and 0.3cm, Grade 2, ER and NV strongly positive and Her2 negative with 2 SLNs negative. MammaPrint testing low-risk.  Patient completed adjuvant breast radiation 9/1/17.  Arimidex started in 8/2017.      Currently patient is doing well without any signs or symptoms to suggest recurrence of breast cancer.  CBC diff today is good. CMP pending and I will f/u.    Last MMG 8/2/23 benign recommend a follow up screening mammogram in one year. Also, recommend to have an MRI of Breasts in February, ordered today .   Patient is tolerating Arimidex fairly well, mentions her cholesterol is elevated and she attributes to arimidex.    She is currently on supplements with DHEA and Testosterone under direction of a functional medicine doctor in [place]. I have advised against taking these hormonal medications as we don't know what effects it will have on her history  of breast cancer. However, it helps her QOL and she wishes to continue despite the warnings. Her doctor at [facility] had discussed this issue with her as well. If she ever decides to stop the AI then she will need to stop these medications as well.     Continue Arimidex. I have recommended continuing up to 10 years because of the elevated risk found on the BCI testing.   Continue routine surveillance visits.  RTC 6 months for follow-up with repeat labs.    She has DEXA done annually with her doctor in [place], last done in 12/2022 with slightly worse osteopenia.  She will continue weight bearing exercise and she prefers to stay off any bone altering agents if possible.  Patient states that she will revisit this topic and possibly consider medication after DEXA in December 2023  All questions answered at this time.      CASPER Shankar

## 2024-03-13 ENCOUNTER — APPOINTMENT (OUTPATIENT)
Dept: RADIOLOGY | Facility: HOSPITAL | Age: 64
End: 2024-03-13
Attending: NURSE PRACTITIONER
Payer: COMMERCIAL

## 2024-03-13 DIAGNOSIS — Z17.0 MALIGNANT NEOPLASM OF CENTRAL PORTION OF RIGHT BREAST IN FEMALE, ESTROGEN RECEPTOR POSITIVE: ICD-10-CM

## 2024-03-13 DIAGNOSIS — C50.111 MALIGNANT NEOPLASM OF CENTRAL PORTION OF RIGHT BREAST IN FEMALE, ESTROGEN RECEPTOR POSITIVE: ICD-10-CM

## 2024-03-13 PROCEDURE — 77049 MRI BREAST C-+ W/CAD BI: CPT | Mod: TC

## 2024-03-13 PROCEDURE — 25500020 PHARM REV CODE 255: Performed by: NURSE PRACTITIONER

## 2024-03-13 PROCEDURE — 77049 MRI BREAST C-+ W/CAD BI: CPT | Mod: 26,,, | Performed by: RADIOLOGY

## 2024-03-13 PROCEDURE — A9577 INJ MULTIHANCE: HCPCS | Performed by: NURSE PRACTITIONER

## 2024-03-13 RX ADMIN — GADOBENATE DIMEGLUMINE 15 ML: 529 INJECTION, SOLUTION INTRAVENOUS at 01:03

## 2024-04-23 ENCOUNTER — OFFICE VISIT (OUTPATIENT)
Dept: HEMATOLOGY/ONCOLOGY | Facility: CLINIC | Age: 64
End: 2024-04-23
Payer: COMMERCIAL

## 2024-04-23 VITALS
HEIGHT: 65 IN | DIASTOLIC BLOOD PRESSURE: 80 MMHG | HEART RATE: 66 BPM | WEIGHT: 136.5 LBS | BODY MASS INDEX: 22.74 KG/M2 | SYSTOLIC BLOOD PRESSURE: 124 MMHG | RESPIRATION RATE: 20 BRPM | TEMPERATURE: 98 F | OXYGEN SATURATION: 95 %

## 2024-04-23 DIAGNOSIS — M81.0 OSTEOPOROSIS OF FEMUR WITHOUT PATHOLOGICAL FRACTURE: ICD-10-CM

## 2024-04-23 DIAGNOSIS — Z12.31 BREAST CANCER SCREENING BY MAMMOGRAM: ICD-10-CM

## 2024-04-23 DIAGNOSIS — Z17.0 MALIGNANT NEOPLASM OF CENTRAL PORTION OF RIGHT BREAST IN FEMALE, ESTROGEN RECEPTOR POSITIVE: Primary | ICD-10-CM

## 2024-04-23 DIAGNOSIS — Z79.811 LONG TERM CURRENT USE OF AROMATASE INHIBITOR: ICD-10-CM

## 2024-04-23 DIAGNOSIS — C50.111 MALIGNANT NEOPLASM OF CENTRAL PORTION OF RIGHT BREAST IN FEMALE, ESTROGEN RECEPTOR POSITIVE: Primary | ICD-10-CM

## 2024-04-23 PROCEDURE — 3008F BODY MASS INDEX DOCD: CPT | Mod: CPTII,S$GLB,,

## 2024-04-23 PROCEDURE — 99214 OFFICE O/P EST MOD 30 MIN: CPT | Mod: S$GLB,,,

## 2024-04-23 PROCEDURE — 1159F MED LIST DOCD IN RCRD: CPT | Mod: CPTII,S$GLB,,

## 2024-04-23 PROCEDURE — 99999 PR PBB SHADOW E&M-EST. PATIENT-LVL IV: CPT | Mod: PBBFAC,,,

## 2024-04-23 PROCEDURE — 3079F DIAST BP 80-89 MM HG: CPT | Mod: CPTII,S$GLB,,

## 2024-04-23 PROCEDURE — 3074F SYST BP LT 130 MM HG: CPT | Mod: CPTII,S$GLB,,

## 2024-04-23 NOTE — PROGRESS NOTES
Subjective:       Patient ID: Giovanny Baum is a 63 y.o. female.    Right Breast Cancer AJCC Stage IIA (T2N0M0)--Diagnosed 6/6/17  Biopsy/pathology:  Right breast needle core biopsy done 6/6/17--invasive mammary carcinoma, Grade 2 measuring at least 6mm, not enough tissue for breast panel.  Left breast needle core biopsy done 10/24/18--nonproliferative fibrocystic changes with abundant calcifications, no carcinoma or atypia identified.  Surgery/pathology:  Right breast, wire-guided partial mastectomy with SLN biopsy done 6/20/17--invasive ductal carcinoma, multifocal, 2 sites, measuring 3.1cm and 0.3cm both Grade 2, with no lymphovascular invasion and margins free, proliferative fibrocystic changes with microcalcifications, fibroadenoma, 2 SLNs negative, mastopexy skin excision unremarkable, ER >75%, CT 51-75%, Her2 1+ negative, Ki67 6%, Mammaprint Low risk, 97.8% 5-year survival with hormone therapy. [facility] 2/19/22 with a YES predictive result, risk of distant recurrence years 5-10 at 4.2% (>4% is considered high risk).    Genetic testing: BRCA1/2 negative in 2017.    DEXA:  DEXA 1/06/21--AP spine T-score -1.5, total hip -2.3, whole body 2.1.  DEXA 12/7/21--AP spine T-score -1.3, total hip -2.2, whole body 1.8 (improved osteopenia).  DEXA 12/12/22--AP spine T-score -1.3 , total hip left -2.3, right -2.4 (slightly worse osteopenia).  DEXA 12/11/23--AP spine T-score -1.9., total hip left-2.4, right -2.6, whole body 1.2    Treatment history:  1. Right breast lumpectomy done 6/20/17.  2. Adjuvant radiation right breast 5600 cGy 8/7/2017--9/1/2017.    Current treatment:  Arimidex started 8/2017    Chief Complaint: 6 Month Follow Up       HPI  Patient presents for follow-up of breast cancer. She is doing well. Continues on Arimidex daily. Patient would like to DC Arimidex but we discussed benefits and she is staying on at this time, she is planning to stop in September. Last MMG 8/2/23 benign recommend a follow  up screening mammogram in one year. Breast MRI on 3/15/2024 was benign. No other new complaints. She continues on DHEA and testosterone, although we have discussed that is not what I would recommend. Last DEXA scan on 12/11/2023 showed worsening bone density and osteoporosis in the right hip. Discussed with patient starting prolia but she refused. She recently started a bone supplement similar to Osteo bi flex. No other problems reported.     No past medical history on file.   Review of patient's allergies indicates:   Allergen Reactions    Amoxicillin-pot clavulanate Nausea Only and Nausea And Vomiting    Codeine Nausea Only and Nausea And Vomiting        Current Outpatient Medications:     anastrozole (ARIMIDEX) 1 mg Tab, TAKE ONE TABLET BY MOUTH ONCE DAILY, Disp: 90 tablet, Rfl: 3    buPROPion (WELLBUTRIN XL) 150 MG TB24 tablet, bupropion HCl  mg 24 hr tablet, extended release  TAKE 1 TABLET EVERY MORNING FOR 5 DAYS THEN 2 DAILY, Disp: , Rfl:     hydroCHLOROthiazide (HYDRODIURIL) 25 MG tablet, hydrochlorothiazide 25 mg tablet  Take 1 tablet every day by oral route., Disp: , Rfl:     hydrOXYchloroQUINE 400 mg Tab, Take 1 tablet by mouth once daily., Disp: , Rfl:     melatonin (MELATIN), Take 1 mg by mouth., Disp: , Rfl:     prasterone, dhea, 25 mg Cap, Take 25 mg by mouth., Disp: , Rfl:     spironolactone (ALDACTONE) 50 MG tablet, spironolactone 50 mg tablet  Take 1 tablet every day by oral route., Disp: , Rfl:     testosterone cypionate (DEPO-TESTOSTERONE IM),  0 Refill(s), Disp: , Rfl:     thyroid, pork, (ARMOUR THYROID) 120 mg Tab, Take 330 mg by mouth., Disp: , Rfl:     Review of Systems   Constitutional:  Negative for activity change, appetite change, fatigue, fever and unexpected weight change.   HENT:  Negative for mouth sores.    Eyes:  Negative for visual disturbance.   Respiratory:  Negative for cough and shortness of breath.    Cardiovascular:  Negative for chest pain.   Gastrointestinal:   Negative for abdominal pain, blood in stool, constipation, diarrhea, nausea and vomiting.   Genitourinary:  Negative for difficulty urinating, frequency, hot flashes and vaginal dryness.   Musculoskeletal:  Negative for back pain.   Integumentary:  Negative for color change, pallor, rash, wound, mole/lesion, breast mass, breast discharge and breast tenderness.   Allergic/Immunologic: Negative for immunocompromised state.   Neurological:  Negative for dizziness, weakness and headaches.   Hematological:  Negative for adenopathy.   Psychiatric/Behavioral:  Negative for behavioral problems and suicidal ideas. The patient is not nervous/anxious.    Breast: Negative for mass and tenderness        Vitals:    04/23/24 1314   BP: 124/80   Pulse: 66   Resp: 20   Temp: 98 °F (36.7 °C)       Wt Readings from Last 3 Encounters:   04/23/24 1314 61.9 kg (136 lb 8 oz)   10/18/23 0946 62.2 kg (137 lb 3.2 oz)   03/22/23 1058 61.9 kg (136 lb 8 oz)      Physical Exam  Vitals reviewed.   Constitutional:       Appearance: Normal appearance. She is normal weight.   HENT:      Head: Normocephalic.   Eyes:      General: Lids are normal. Vision grossly intact.      Extraocular Movements: Extraocular movements intact.      Conjunctiva/sclera: Conjunctivae normal.   Cardiovascular:      Rate and Rhythm: Normal rate and regular rhythm.      Pulses: Normal pulses.      Heart sounds: Normal heart sounds, S1 normal and S2 normal.   Pulmonary:      Effort: Pulmonary effort is normal.      Breath sounds: Normal breath sounds.   Chest:      Comments: Right breast s/p lumpectomy, healed well, smooth scar, left breast with scars from reduction, no masses, no palpable axillary adenopathy bilaterally  Abdominal:      General: Abdomen is flat. Bowel sounds are normal.      Palpations: Abdomen is soft.   Musculoskeletal:      Cervical back: Normal range of motion and neck supple.   Skin:     General: Skin is warm and moist.      Capillary Refill: Capillary  refill takes less than 2 seconds.   Neurological:      General: No focal deficit present.      Mental Status: She is alert and oriented to person, place, and time.   Psychiatric:         Attention and Perception: Attention normal.         Mood and Affect: Mood normal.         Speech: Speech normal.         Behavior: Behavior normal. Behavior is cooperative.         Cognition and Memory: Cognition normal.         Judgment: Judgment normal.       ECOG SCORE           No visits with results within 1 Week(s) from this visit.   Latest known visit with results is:   Lab Visit on 03/13/2024   Component Date Value    WBC 03/13/2024 5.47     RBC 03/13/2024 4.44     Hgb 03/13/2024 14.5     Hct 03/13/2024 42.0     MCV 03/13/2024 94.6 (H)     MCH 03/13/2024 32.7 (H)     MCHC 03/13/2024 34.5     RDW 03/13/2024 12.2     Platelet 03/13/2024 221     MPV 03/13/2024 9.1     Neut % 03/13/2024 35.1     Lymph % 03/13/2024 52.5     Mono % 03/13/2024 8.4     Eos % 03/13/2024 3.1     Basophil % 03/13/2024 0.7     Lymph # 03/13/2024 2.87     Neut # 03/13/2024 1.92 (L)     Mono # 03/13/2024 0.46     Eos # 03/13/2024 0.17     Baso # 03/13/2024 0.04     IG# 03/13/2024 0.01     IG% 03/13/2024 0.2           Assessment:       1. Malignant neoplasm of central portion of right breast in female, estrogen receptor positive    2. Long term current use of aromatase inhibitor    3. Osteoporosis of femur without pathological fracture    4. Breast cancer screening by mammogram              Plan:       Patient with right breast cancer s/p lumpectomy done 6/20/17 at [facility] in [place], [place]. Tumor was IDCA, multifocal 3.1cm and 0.3cm, Grade 2, ER and KS strongly positive and Her2 negative with 2 SLNs negative. MammaPrint testing low-risk.  Patient completed adjuvant breast radiation 9/1/17.  Arimidex started in 8/2017.      Currently patient is doing well without any signs or symptoms to suggest recurrence of breast cancer.  CBC diff today is good.   Last MMG  8/2/23 benign recommend a follow up screening mammogram in one year.   MRI breast on 3/15/24 benign recommend alternating mammogram and MRI every six months    Patient is tolerating Arimidex fairly well, mentions her cholesterol is elevated and she attributes to arimidex.    She is currently on supplements with DHEA and Testosterone under direction of a functional medicine doctor in [place]. I have advised against taking these hormonal medications as we don't know what effects it will have on her history of breast cancer. However, it helps her QOL and she wishes to continue despite the warnings. Her doctor at [facility] had discussed this issue with her as well. If she ever decides to stop the AI then she will need to stop these medications as well.     Continue Arimidex. I have recommended continuing up to 10 years because of the elevated risk found on the BCI testing.   Continue routine surveillance visits.  RTC 5 months for follow-up with repeat labs and bilateral screening mammogram    She has DEXA done annually with her doctor in [place], last done in 12/2023 with osteoporosis right hip, patient refused prolia  She will continue weight bearing exercise and she prefers to stay off any bone altering agents if possible.    All questions answered at this time.      Rosaura Morelos, MADHAVIP-C  Oncology/Hematology  [facility]

## 2024-09-04 ENCOUNTER — HOSPITAL ENCOUNTER (OUTPATIENT)
Dept: RADIOLOGY | Facility: HOSPITAL | Age: 64
Discharge: HOME | End: 2024-09-04
Payer: COMMERCIAL

## 2024-09-04 DIAGNOSIS — Z12.31 BREAST CANCER SCREENING BY MAMMOGRAM: ICD-10-CM

## 2024-09-04 PROCEDURE — 77063 BREAST TOMOSYNTHESIS BI: CPT | Mod: 26,,, | Performed by: STUDENT IN AN ORGANIZED HEALTH CARE EDUCATION/TRAINING PROGRAM

## 2024-09-04 PROCEDURE — 77067 SCR MAMMO BI INCL CAD: CPT | Mod: TC

## 2024-09-04 PROCEDURE — 77067 SCR MAMMO BI INCL CAD: CPT | Mod: 26,,, | Performed by: STUDENT IN AN ORGANIZED HEALTH CARE EDUCATION/TRAINING PROGRAM

## 2024-09-30 NOTE — PROGRESS NOTES
Subjective:       Patient ID: Giovanny Baum is a 63 y.o. female.    Right Breast Cancer AJCC Stage IIA ([id number])--Diagnosed 6/6/17  Biopsy/pathology:  Right breast needle core biopsy done 6/6/17--invasive mammary carcinoma, Grade 2 measuring at least 6mm, not enough tissue for breast panel.  Left breast needle core biopsy done 10/24/18--nonproliferative fibrocystic changes with abundant calcifications, no carcinoma or atypia identified.  Surgery/pathology:  Right breast, wire-guided partial mastectomy with SLN biopsy done 6/20/17--invasive ductal carcinoma, multifocal, 2 sites, measuring 3.1cm and 0.3cm both Grade 2, with no lymphovascular invasion and margins free, proliferative fibrocystic changes with microcalcifications, fibroadenoma, 2 SLNs negative, mastopexy skin excision unremarkable, ER >75%, OK 51-75%, Her2 1+ negative, Ki67 6%, Mammaprint Low risk, 97.8% 5-year survival with hormone therapy.   BCI 2/19/22 with a YES predictive result, risk of distant recurrence years 5-10 at 4.2% (>4% is considered high risk).    Genetic testing: BRCA1/2 negative in 2017.    DEXA:  DEXA 1/06/21--AP spine T-score -1.5, total hip -2.3, whole body 2.1.  DEXA 12/7/21--AP spine T-score -1.3, total hip -2.2, whole body 1.8 (improved osteopenia).  DEXA 12/12/22--AP spine T-score -1.3 , total hip left -2.3, right -2.4 (slightly worse osteopenia).  DEXA 12/11/23--AP spine T-score -1.9., total hip left-2.4, right -2.6, whole body 1.2    Treatment history:  1. Right breast lumpectomy done 6/20/17.  2. Adjuvant radiation right breast 5600 cGy 8/7/2017--9/1/2017.    Current treatment:  Arimidex started 8/2017 (BCI high risk, recommended to continue for 10 years).    Chief Complaint: Other Misc (Pt reports no concerns today.)      HPI  Patient presents for follow-up of breast cancer. She is doing well. Continues on Arimidex. She did not stop taking. She has been hesitant because she read the risks of late recurrences. We  discussed the results from her BCI saying that she would benefit from extended therapy. However, her most recent bone density is worse. I did discuss that if she continues refusing treatment for her osteoporosis, we likely need to stop her AI because her bone will continue to get worse. Also discussed the importance of getting off testosterone and DHEA hormones. She will think about all these things. For now, she has chosen to continue her AI. She will be having a repeat bone density in 12/2024 and will discuss with her functional medicine doctor.    History reviewed. No pertinent past medical history.   Review of patient's allergies indicates:   Allergen Reactions    Amoxicillin-pot clavulanate Nausea Only and Nausea And Vomiting    Codeine Nausea Only and Nausea And Vomiting        Current Outpatient Medications:     anastrozole (ARIMIDEX) 1 mg Tab, TAKE ONE TABLET BY MOUTH ONCE DAILY, Disp: 90 tablet, Rfl: 3    buPROPion (WELLBUTRIN XL) 150 MG TB24 tablet, bupropion HCl  mg 24 hr tablet, extended release  TAKE 1 TABLET EVERY MORNING FOR 5 DAYS THEN 2 DAILY, Disp: , Rfl:     hydroCHLOROthiazide (HYDRODIURIL) 25 MG tablet, hydrochlorothiazide 25 mg tablet  Take 1 tablet every day by oral route., Disp: , Rfl:     melatonin (MELATIN), Take 1 mg by mouth., Disp: , Rfl:     prasterone, dhea, 25 mg Cap, Take 25 mg by mouth., Disp: , Rfl:     spironolactone (ALDACTONE) 50 MG tablet, spironolactone 50 mg tablet  Take 1 tablet every day by oral route., Disp: , Rfl:     testosterone cypionate (DEPO-TESTOSTERONE IM),  0 Refill(s), Disp: , Rfl:     thyroid, pork, (ARMOUR THYROID) 120 mg Tab, Take 330 mg by mouth., Disp: , Rfl:     hydrOXYchloroQUINE 400 mg Tab, Take 1 tablet by mouth once daily. (Patient not taking: Reported on 10/2/2024), Disp: , Rfl:     Review of Systems   Constitutional:  Negative for activity change, appetite change, fatigue, fever and unexpected weight change.   HENT:  Negative for mouth sores.     Eyes:  Negative for visual disturbance.   Respiratory:  Negative for cough and shortness of breath.    Cardiovascular:  Negative for chest pain.   Gastrointestinal:  Negative for abdominal pain, blood in stool, constipation, diarrhea, nausea and vomiting.   Genitourinary:  Negative for difficulty urinating, frequency, hot flashes and vaginal dryness.   Musculoskeletal:  Negative for back pain.   Integumentary:  Negative for color change, pallor, rash, wound, mole/lesion, breast mass, breast discharge and breast tenderness.   Allergic/Immunologic: Negative for immunocompromised state.   Neurological:  Negative for dizziness, weakness and headaches.   Hematological:  Negative for adenopathy.   Psychiatric/Behavioral:  Negative for behavioral problems and suicidal ideas. The patient is not nervous/anxious.    Breast: Negative for mass and tenderness        Vitals:    10/02/24 1110   BP: 131/83   Pulse: 60   Resp: 14   Temp: 97.9 °F (36.6 °C)         Wt Readings from Last 3 Encounters:   10/02/24 1110 62.7 kg (138 lb 4.8 oz)   04/23/24 1314 61.9 kg (136 lb 8 oz)   10/18/23 0946 62.2 kg (137 lb 3.2 oz)      Physical Exam  Vitals reviewed.   Constitutional:       Appearance: Normal appearance. She is normal weight.   HENT:      Head: Normocephalic.   Eyes:      General: Lids are normal. Vision grossly intact.      Extraocular Movements: Extraocular movements intact.      Conjunctiva/sclera: Conjunctivae normal.   Cardiovascular:      Rate and Rhythm: Normal rate and regular rhythm.      Pulses: Normal pulses.      Heart sounds: Normal heart sounds, S1 normal and S2 normal.   Pulmonary:      Effort: Pulmonary effort is normal.      Breath sounds: Normal breath sounds.   Chest:      Comments: Right breast s/p lumpectomy, healed well, smooth scar, left breast with scars from reduction, no masses, no palpable axillary adenopathy bilaterally  Abdominal:      General: Abdomen is flat. Bowel sounds are normal.      Palpations:  Abdomen is soft.   Musculoskeletal:      Cervical back: Normal range of motion and neck supple.   Skin:     General: Skin is warm and moist.      Capillary Refill: Capillary refill takes less than 2 seconds.   Neurological:      General: No focal deficit present.      Mental Status: She is alert and oriented to person, place, and time.   Psychiatric:         Attention and Perception: Attention normal.         Mood and Affect: Mood normal.         Speech: Speech normal.         Behavior: Behavior normal. Behavior is cooperative.         Cognition and Memory: Cognition normal.         Judgment: Judgment normal.       ECOG SCORE           Lab Visit on 10/02/2024   Component Date Value    WBC 10/02/2024 4.70     RBC 10/02/2024 4.45     Hgb 10/02/2024 14.9     Hct 10/02/2024 43.0     MCV 10/02/2024 96.6 (H)     MCH 10/02/2024 33.5 (H)     MCHC 10/02/2024 34.7     RDW 10/02/2024 12.1     Platelet 10/02/2024 242     MPV 10/02/2024 9.2     Neut % 10/02/2024 39.1     Lymph % 10/02/2024 46.8     Mono % 10/02/2024 7.4     Eos % 10/02/2024 4.9     Basophil % 10/02/2024 0.9     Lymph # 10/02/2024 2.20     Neut # 10/02/2024 1.84 (L)     Mono # 10/02/2024 0.35     Eos # 10/02/2024 0.23     Baso # 10/02/2024 0.04     IG# 10/02/2024 0.04     IG% 10/02/2024 0.9           Assessment:       1. Malignant neoplasm of central portion of right breast in female, estrogen receptor positive          Plan:       Patient with right breast cancer s/p lumpectomy done 6/20/17 at [facility] in [place], [place]. Tumor was IDCA, multifocal 3.1cm and 0.3cm, Grade 2, ER and KS strongly positive and Her2 negative with 2 SLNs negative. MammaPrint testing low-risk.  Patient completed adjuvant breast radiation 9/1/17.  Arimidex started in 8/2017.      Currently patient is doing well without any signs or symptoms to suggest recurrence of breast cancer.  CBC diff today is good. Will f/u CMP.     Last MMG 9/4/24 benign recommend a follow up screening mammogram in one  year.   Last MRI breast on 3/15/24 benign.  Will order MRI for 3/2025.       Patient is tolerating Arimidex fairly well.  Discussed continuation for 10 years based on BCI high risk. But she has ongoing osteoporosis.  So we discussed that she needs to consider treatment for osteoporosis, if we are going to continue on the AI for 10 years.  She has an upcoming repeat DEXA with her functional medicine doctor in [place] in 12/2024 and she will think about it.     She remains on supplements with DHEA and Testosterone under direction of a functional medicine doctor in [place]. I have advised again against taking these hormonal medications as we don't know what effects it will have on her history of breast cancer. Her doctor at [facility] had discussed this issue with her as well.   We discussed again that if she ever decides to stop the AI then she will need to stop these medications as well.     Continue Arimidex.     Continue routine surveillance visits.  RTC 6 months for follow-up with repeat labs.    All questions answered at this time.    Beatriz Barger MD

## 2024-10-02 ENCOUNTER — OFFICE VISIT (OUTPATIENT)
Dept: HEMATOLOGY/ONCOLOGY | Facility: CLINIC | Age: 64
End: 2024-10-02
Payer: COMMERCIAL

## 2024-10-02 ENCOUNTER — LAB VISIT (OUTPATIENT)
Dept: LAB | Facility: HOSPITAL | Age: 64
End: 2024-10-02
Attending: INTERNAL MEDICINE
Payer: COMMERCIAL

## 2024-10-02 VITALS
TEMPERATURE: 98 F | HEART RATE: 60 BPM | HEIGHT: 65 IN | SYSTOLIC BLOOD PRESSURE: 131 MMHG | OXYGEN SATURATION: 98 % | DIASTOLIC BLOOD PRESSURE: 83 MMHG | BODY MASS INDEX: 23.04 KG/M2 | RESPIRATION RATE: 14 BRPM | WEIGHT: 138.31 LBS

## 2024-10-02 DIAGNOSIS — Z79.811 LONG TERM CURRENT USE OF AROMATASE INHIBITOR: ICD-10-CM

## 2024-10-02 DIAGNOSIS — Z17.0 MALIGNANT NEOPLASM OF CENTRAL PORTION OF RIGHT BREAST IN FEMALE, ESTROGEN RECEPTOR POSITIVE: ICD-10-CM

## 2024-10-02 DIAGNOSIS — C50.111 MALIGNANT NEOPLASM OF CENTRAL PORTION OF RIGHT BREAST IN FEMALE, ESTROGEN RECEPTOR POSITIVE: ICD-10-CM

## 2024-10-02 DIAGNOSIS — C50.111 MALIGNANT NEOPLASM OF CENTRAL PORTION OF RIGHT BREAST IN FEMALE, ESTROGEN RECEPTOR POSITIVE: Primary | ICD-10-CM

## 2024-10-02 DIAGNOSIS — Z17.0 MALIGNANT NEOPLASM OF CENTRAL PORTION OF RIGHT BREAST IN FEMALE, ESTROGEN RECEPTOR POSITIVE: Primary | ICD-10-CM

## 2024-10-02 LAB
ALBUMIN SERPL-MCNC: 4 G/DL (ref 3.4–4.8)
ALBUMIN/GLOB SERPL: 1.7 RATIO (ref 1.1–2)
ALP SERPL-CCNC: 80 UNIT/L (ref 40–150)
ALT SERPL-CCNC: 24 UNIT/L (ref 0–55)
ANION GAP SERPL CALC-SCNC: 10 MEQ/L
AST SERPL-CCNC: 26 UNIT/L (ref 5–34)
BASOPHILS # BLD AUTO: 0.04 X10(3)/MCL
BASOPHILS NFR BLD AUTO: 0.9 %
BILIRUB SERPL-MCNC: 0.5 MG/DL
BUN SERPL-MCNC: 16.5 MG/DL (ref 9.8–20.1)
CALCIUM SERPL-MCNC: 9.8 MG/DL (ref 8.4–10.2)
CHLORIDE SERPL-SCNC: 104 MMOL/L (ref 98–107)
CO2 SERPL-SCNC: 28 MMOL/L (ref 23–31)
CREAT SERPL-MCNC: 0.82 MG/DL (ref 0.55–1.02)
CREAT/UREA NIT SERPL: 20
EOSINOPHIL # BLD AUTO: 0.23 X10(3)/MCL (ref 0–0.9)
EOSINOPHIL NFR BLD AUTO: 4.9 %
ERYTHROCYTE [DISTWIDTH] IN BLOOD BY AUTOMATED COUNT: 12.1 % (ref 11.5–17)
GFR SERPLBLD CREATININE-BSD FMLA CKD-EPI: >60 ML/MIN/1.73/M2
GLOBULIN SER-MCNC: 2.3 GM/DL (ref 2.4–3.5)
GLUCOSE SERPL-MCNC: 126 MG/DL (ref 82–115)
HCT VFR BLD AUTO: 43 % (ref 37–47)
HGB BLD-MCNC: 14.9 G/DL (ref 12–16)
IMM GRANULOCYTES # BLD AUTO: 0.04 X10(3)/MCL (ref 0–0.04)
IMM GRANULOCYTES NFR BLD AUTO: 0.9 %
LYMPHOCYTES # BLD AUTO: 2.2 X10(3)/MCL (ref 0.6–4.6)
LYMPHOCYTES NFR BLD AUTO: 46.8 %
MCH RBC QN AUTO: 33.5 PG (ref 27–31)
MCHC RBC AUTO-ENTMCNC: 34.7 G/DL (ref 33–36)
MCV RBC AUTO: 96.6 FL (ref 80–94)
MONOCYTES # BLD AUTO: 0.35 X10(3)/MCL (ref 0.1–1.3)
MONOCYTES NFR BLD AUTO: 7.4 %
NEUTROPHILS # BLD AUTO: 1.84 X10(3)/MCL (ref 2.1–9.2)
NEUTROPHILS NFR BLD AUTO: 39.1 %
PLATELET # BLD AUTO: 242 X10(3)/MCL (ref 130–400)
PMV BLD AUTO: 9.2 FL (ref 7.4–10.4)
POTASSIUM SERPL-SCNC: 4.4 MMOL/L (ref 3.5–5.1)
PROT SERPL-MCNC: 6.3 GM/DL (ref 5.8–7.6)
RBC # BLD AUTO: 4.45 X10(6)/MCL (ref 4.2–5.4)
SODIUM SERPL-SCNC: 142 MMOL/L (ref 136–145)
WBC # BLD AUTO: 4.7 X10(3)/MCL (ref 4.5–11.5)

## 2024-10-02 PROCEDURE — 99999 PR PBB SHADOW E&M-EST. PATIENT-LVL IV: CPT | Mod: PBBFAC,,, | Performed by: INTERNAL MEDICINE

## 2024-10-02 PROCEDURE — 3075F SYST BP GE 130 - 139MM HG: CPT | Mod: CPTII,S$GLB,, | Performed by: INTERNAL MEDICINE

## 2024-10-02 PROCEDURE — 3079F DIAST BP 80-89 MM HG: CPT | Mod: CPTII,S$GLB,, | Performed by: INTERNAL MEDICINE

## 2024-10-02 PROCEDURE — 99214 OFFICE O/P EST MOD 30 MIN: CPT | Mod: S$GLB,,, | Performed by: INTERNAL MEDICINE

## 2024-10-02 PROCEDURE — 36415 COLL VENOUS BLD VENIPUNCTURE: CPT

## 2024-10-02 PROCEDURE — 1159F MED LIST DOCD IN RCRD: CPT | Mod: CPTII,S$GLB,, | Performed by: INTERNAL MEDICINE

## 2024-10-02 PROCEDURE — 85025 COMPLETE CBC W/AUTO DIFF WBC: CPT

## 2024-10-02 PROCEDURE — 1160F RVW MEDS BY RX/DR IN RCRD: CPT | Mod: CPTII,S$GLB,, | Performed by: INTERNAL MEDICINE

## 2024-10-02 PROCEDURE — 80053 COMPREHEN METABOLIC PANEL: CPT

## 2024-10-02 PROCEDURE — 3008F BODY MASS INDEX DOCD: CPT | Mod: CPTII,S$GLB,, | Performed by: INTERNAL MEDICINE

## 2025-04-03 ENCOUNTER — PATIENT MESSAGE (OUTPATIENT)
Dept: HEMATOLOGY/ONCOLOGY | Facility: CLINIC | Age: 65
End: 2025-04-03
Payer: COMMERCIAL